# Patient Record
Sex: FEMALE | Race: WHITE | NOT HISPANIC OR LATINO | Employment: FULL TIME | URBAN - METROPOLITAN AREA
[De-identification: names, ages, dates, MRNs, and addresses within clinical notes are randomized per-mention and may not be internally consistent; named-entity substitution may affect disease eponyms.]

---

## 2023-01-02 ENCOUNTER — TRANSFERRED RECORDS (OUTPATIENT)
Dept: HEALTH INFORMATION MANAGEMENT | Facility: CLINIC | Age: 28
End: 2023-01-02

## 2024-05-03 ENCOUNTER — LAB REQUISITION (OUTPATIENT)
Dept: LAB | Facility: CLINIC | Age: 29
End: 2024-05-03

## 2024-05-03 DIAGNOSIS — Z01.419 ENCOUNTER FOR GYNECOLOGICAL EXAMINATION (GENERAL) (ROUTINE) WITHOUT ABNORMAL FINDINGS: ICD-10-CM

## 2024-05-03 PROCEDURE — G0145 SCR C/V CYTO,THINLAYER,RESCR: HCPCS | Performed by: PHYSICIAN ASSISTANT

## 2024-05-03 PROCEDURE — 87624 HPV HI-RISK TYP POOLED RSLT: CPT | Performed by: PHYSICIAN ASSISTANT

## 2024-05-08 LAB
BKR LAB AP GYN ADEQUACY: NORMAL
BKR LAB AP GYN INTERPRETATION: NORMAL
BKR LAB AP HPV REFLEX: NORMAL
BKR LAB AP LMP: NORMAL
BKR LAB AP PREVIOUS ABNL DX: NORMAL
BKR LAB AP PREVIOUS ABNORMAL: NORMAL
PATH REPORT.COMMENTS IMP SPEC: NORMAL
PATH REPORT.COMMENTS IMP SPEC: NORMAL
PATH REPORT.RELEVANT HX SPEC: NORMAL

## 2024-05-09 ENCOUNTER — MEDICAL CORRESPONDENCE (OUTPATIENT)
Dept: HEALTH INFORMATION MANAGEMENT | Facility: CLINIC | Age: 29
End: 2024-05-09

## 2024-05-09 ENCOUNTER — TRANSFERRED RECORDS (OUTPATIENT)
Dept: HEALTH INFORMATION MANAGEMENT | Facility: CLINIC | Age: 29
End: 2024-05-09

## 2024-05-09 DIAGNOSIS — R42 DIZZINESS AND GIDDINESS: ICD-10-CM

## 2024-05-09 DIAGNOSIS — G90.A POTS (POSTURAL ORTHOSTATIC TACHYCARDIA SYNDROME): Primary | ICD-10-CM

## 2024-05-13 LAB
HUMAN PAPILLOMA VIRUS 16 DNA: NEGATIVE
HUMAN PAPILLOMA VIRUS 18 DNA: NEGATIVE
HUMAN PAPILLOMA VIRUS FINAL DIAGNOSIS: ABNORMAL
HUMAN PAPILLOMA VIRUS OTHER HR: POSITIVE

## 2024-08-06 NOTE — TELEPHONE ENCOUNTER
Action 8/6/24 Pt call   Action Taken Spoke to pt via phone regarding records. She stated she doesn't have any formal records regarding POTS. Last EKG was done in early 2022. Said she went to urgent care in Jan 2023 at [facility] and provider mentioned she has some symptoms consistent with POTS. Did an informal checking of vitals sitting vs. standing. She has note she will email to me or bring a hard  copy to appt visit. Otherwise no other cardiac  or POTS records.    Received records from pt via email. She realized she doesn't have full notes, just AVS. Sent to scanning. 8/8

## 2024-08-13 ENCOUNTER — OFFICE VISIT (OUTPATIENT)
Dept: CARDIOLOGY | Facility: CLINIC | Age: 29
End: 2024-08-13
Attending: INTERNAL MEDICINE
Payer: COMMERCIAL

## 2024-08-13 ENCOUNTER — PRE VISIT (OUTPATIENT)
Dept: CARDIOLOGY | Facility: CLINIC | Age: 29
End: 2024-08-13

## 2024-08-13 VITALS
OXYGEN SATURATION: 98 % | WEIGHT: 144 LBS | DIASTOLIC BLOOD PRESSURE: 80 MMHG | SYSTOLIC BLOOD PRESSURE: 116 MMHG | HEART RATE: 88 BPM

## 2024-08-13 DIAGNOSIS — G90.A POTS (POSTURAL ORTHOSTATIC TACHYCARDIA SYNDROME): Primary | ICD-10-CM

## 2024-08-13 DIAGNOSIS — D68.59 PRIMARY HYPERCOAGULABLE STATE (H): ICD-10-CM

## 2024-08-13 DIAGNOSIS — R55 VASOVAGAL SYNCOPE: ICD-10-CM

## 2024-08-13 DIAGNOSIS — R55 SYNCOPE, UNSPECIFIED SYNCOPE TYPE: ICD-10-CM

## 2024-08-13 PROCEDURE — 93010 ELECTROCARDIOGRAM REPORT: CPT | Mod: XE | Performed by: INTERNAL MEDICINE

## 2024-08-13 PROCEDURE — 99204 OFFICE O/P NEW MOD 45 MIN: CPT | Performed by: INTERNAL MEDICINE

## 2024-08-13 PROCEDURE — 93005 ELECTROCARDIOGRAM TRACING: CPT

## 2024-08-13 PROCEDURE — 93242 EXT ECG>48HR<7D RECORDING: CPT | Performed by: INTERNAL MEDICINE

## 2024-08-13 PROCEDURE — 99211 OFF/OP EST MAY X REQ PHY/QHP: CPT | Performed by: INTERNAL MEDICINE

## 2024-08-13 RX ORDER — SPIRONOLACTONE 100 MG/1
TABLET, FILM COATED ORAL
COMMUNITY

## 2024-08-13 RX ORDER — DEXTROAMPHETAMINE SACCHARATE, AMPHETAMINE ASPARTATE MONOHYDRATE, DEXTROAMPHETAMINE SULFATE AND AMPHETAMINE SULFATE 7.5; 7.5; 7.5; 7.5 MG/1; MG/1; MG/1; MG/1
1 CAPSULE, EXTENDED RELEASE ORAL EVERY MORNING
COMMUNITY

## 2024-08-13 RX ORDER — TRAZODONE HYDROCHLORIDE 50 MG/1
50 TABLET, FILM COATED ORAL
COMMUNITY
Start: 2023-12-13

## 2024-08-13 RX ORDER — LEVONORGESTREL 52 MG/1
INTRAUTERINE DEVICE INTRAUTERINE
COMMUNITY
Start: 2023-12-13

## 2024-08-13 ASSESSMENT — PAIN SCALES - GENERAL: PAINLEVEL: NO PAIN (0)

## 2024-08-13 NOTE — PATIENT INSTRUCTIONS
Ziopatch placed today, to be worn for 3 days.  After monitor is off, complete an echocardiogram (ultrasound of heart).    Follow up based on results.

## 2024-08-13 NOTE — NURSING NOTE
Ann Bynum arrived here on 8/13/2024 9:52 AM for 3-7 Days  Zio monitor placement per ordering provider Darryl Arnold MD  for the diagnosis Syncope, unspecified syncope type [R55] .  Dr. Hilliard is the supervising MD. Patient s skin was prepped per protocol.  Zio monitor was placed.  Instructions were reviewed with and given to the patient.  Patient verbalized understanding of wear, troubleshooting and monitor return instructions.

## 2024-08-13 NOTE — LETTER
8/13/2024      RE: Ann Bynum  [address]  [address]  [facility] [address]       Dear Colleague,    Thank you for the opportunity to participate in the care of your patient, Ann Bynum, at the [facility] [place] at [facility]. Please see a copy of my visit note below.    I am delighted to see Ann Bynum in consultation.The primary encounter diagnosis was POTS (postural orthostatic tachycardia syndrome). A diagnosis of Syncope, unspecified syncope type was also pertinent to this visit.   As you know, the patient is a 28 year old  female. She   has a past medical history of Palpitations and Syncope..    On this visit, the patient states that she has syncopal and near syncopal episodes when going from sitting to standing .  The patient denies chest pressure/discomfort, dyspnea, orthopnea, paroxysmal nocturnal dyspnea, and lower extermity edema.    The patient's cardiovascular risk factors include none.    The following portions of the patient's history were reviewed and updated as appropriate: allergies, current medications, past family history, past medical history, past social history, past surgical history, and the problem list.    PMH: The patient's past medical history includes:    Past Medical History:   Diagnosis Date     Palpitations      Syncope       History reviewed. No pertinent surgical history.    The patient's medications as of the current encounter are:     Current Outpatient Medications   Medication Sig Dispense Refill     amphetamine-dextroamphetamine (ADDERALL XR) 30 MG 24 hr capsule Take 1 capsule by mouth every morning       FLUoxetine (PROZAC) 20 MG capsule Take 20 mg by mouth daily       levonorgestrel (MIRENA, 52 MG,) 52 MG (20 mcg/day) IUD Take by intrauterine route.       Melatonin 2.5 MG CHEW Take by mouth as needed       spironolactone (ALDACTONE) 100 MG tablet        traZODone (DESYREL) 50 MG  tablet Take 50 mg by mouth         Labs:     Lab Requisition on 05/03/2024   Component Date Value Ref Range Status     Interpretation 05/03/2024 Negative for Intraepithelial Lesion or Malignancy (NILM)    Final     Comment 05/03/2024    Final                    Value:This result contains rich text formatting which cannot be displayed here.     Specimen Adequacy 05/03/2024 Satisfactory for evaluation, endocervical/transformation zone component present   Final     Clinical Information 05/03/2024    Final                    Value:This result contains rich text formatting which cannot be displayed here.     LMP/Menopause Date 05/03/2024    Final                    Value:This result contains rich text formatting which cannot be displayed here.     Reflex Testing 05/03/2024 Yes regardless of result   Final     Previous Abnormal? 05/03/2024    Final                    Value:This result contains rich text formatting which cannot be displayed here.     Previous Abnormal Diagnosis 05/03/2024    Final                    Value:This result contains rich text formatting which cannot be displayed here.     Performing Labs 05/03/2024    Final                    Value:This result contains rich text formatting which cannot be displayed here.     Other HR HPV 05/03/2024 Positive (A)  Negative Final     HPV16 DNA 05/03/2024 Negative  Negative Final     HPV18 DNA 05/03/2024 Negative  Negative Final     FINAL DIAGNOSIS 05/03/2024    Final                    Value:This result contains rich text formatting which cannot be displayed here.       Allergies:  No Known Allergies    Family History:   Family History   Problem Relation Age of Onset     Hyperlipidemia Mother      Hypertension Mother      Coronary Artery Disease Father      Hyperlipidemia Father      Hypertension Father      No Known Problems Brother      No Known Problems Sister        Psychosocial history:  reports that she has never smoked. She has never been exposed to tobacco  smoke. She has never used smokeless tobacco. She reports that she does not use drugs.    Review of systems: negative for, exertional chest pain or pressure, paroxysmal nocturnal dyspnea, dyspnea on exertion, orthopnea, lower extremity edema, claudication, and exercise intolerance    In addition,   General: No change in weight, sleep or appetite.  Normal energy.  No fever or chills  Eyes: Negative for vision changes or eye problems  ENT: No problems with ears, nose or throat.  No difficulty swallowing.  Resp: No coughing, wheezing or shortness of breath; h/o provoked PE and hypercoag state  GI: No nausea, vomiting,  heartburn, abdominal pain, diarrhea, constipation or change in bowel habits  : No urinary frequency or dysuria, bladder or kidney problems  Musculoskeletal: No significant muscle or joint pains  Neurologic: No headaches, numbness, tingling, weakness, problems with balance or coordination  Psychiatric: stable anxiety  Heme/immune/allergy: No history of bleeding or clotting problems or anemia.    Endocrine: No history of thyroid disease, diabetes or other endocrine disorders  Skin: acne  Vascular:  No claudication, lifestyle limiting or otherwise; no ischemic rest pain; no non-healing ulcers. No weakness, No loss of sensation        Physical examination  Vitals: /80 (BP Location: Right arm, Patient Position: Standing, Cuff Size: Adult Regular)   Pulse 88   Wt 65.3 kg (144 lb)   SpO2 98%   BMI= There is no height or weight on file to calculate BMI.    Orthostatic unremarkable    In general, the patient is a pleasant female in no apparent distress.    HEENT: Normiocephalic and atraumatic.  PERRLA.  EOMI.  Sclerae white, not injected.    Neck: No adenopathy.  No thyromegaly. Carotids +2/2 bilaterally without bruits.  No jugular venous distension.   Heart:  The PMI is in the 5th ICS in the midclavicular line. There is no heave. Regular rate and rhythm. Normal S1, S2 splits physiologically. No murmur,  rub, click, or gallop.    Lungs: Clear to asculation.  No ronchi, wheezes, rales.  No dullness to percussion.   Abdomen: Soft, nontender, nondistended. No organomegaly. No AAA.  No bruits.   Extremities: No clubbing, cyanosis, or edema. The pulses were intact bilaterally.   Neurological: The neurological examination reveal a patient who was oriented to person, place, and time.  The remainder of the examination was nonfocal.    Cardiac tests include:    8/13/2024 - ECG - NSR, normal    Assessment and Plan    Neurocardiogenic syncope - will check echo, zio  - discussed hydration, compression stocking, salt intake  - discussed tilt table  - discussed exacerbation by Adderall and spironolactone  - discussed beat blockers, fludrocortisone, midodrine, SSRIs  - patient elected conservative management  - patient moving next week to [place] [id number]. H/o PE with hypercoag state    The patient is to return  prn. The patient understood the treatment plan as outlined above.  There were no barriers to learning.      Darryl Arnold MD     Please do not hesitate to contact me if you have any questions/concerns.     Sincerely,     Darryl Arnold MD

## 2024-08-13 NOTE — NURSING NOTE
Chief Complaint   Patient presents with    New Patient      8-13-24 Arnold       Vitals were taken, medications reconciled and EKG performed.    Jonny Durant, Visit Facilitator  8:44 AM

## 2024-08-13 NOTE — PROGRESS NOTES
I am delighted to see Ann Bynum in consultation.The primary encounter diagnosis was POTS (postural orthostatic tachycardia syndrome). A diagnosis of Syncope, unspecified syncope type was also pertinent to this visit.   As you know, the patient is a 28 year old  female. She   has a past medical history of Palpitations and Syncope..    On this visit, the patient states that she has syncopal and near syncopal episodes when going from sitting to standing .  The patient denies chest pressure/discomfort, dyspnea, orthopnea, paroxysmal nocturnal dyspnea, and lower extermity edema.    The patient's cardiovascular risk factors include none.    The following portions of the patient's history were reviewed and updated as appropriate: allergies, current medications, past family history, past medical history, past social history, past surgical history, and the problem list.    PMH: The patient's past medical history includes:    Past Medical History:   Diagnosis Date    Palpitations     Syncope       History reviewed. No pertinent surgical history.    The patient's medications as of the current encounter are:     Current Outpatient Medications   Medication Sig Dispense Refill    amphetamine-dextroamphetamine (ADDERALL XR) 30 MG 24 hr capsule Take 1 capsule by mouth every morning      FLUoxetine (PROZAC) 20 MG capsule Take 20 mg by mouth daily      levonorgestrel (MIRENA, 52 MG,) 52 MG (20 mcg/day) IUD Take by intrauterine route.      Melatonin 2.5 MG CHEW Take by mouth as needed      spironolactone (ALDACTONE) 100 MG tablet       traZODone (DESYREL) 50 MG tablet Take 50 mg by mouth         Labs:     Lab Requisition on 05/03/2024   Component Date Value Ref Range Status    Interpretation 05/03/2024 Negative for Intraepithelial Lesion or Malignancy (NILM)    Final    Comment 05/03/2024    Final                    Value:This result contains rich text formatting which cannot be displayed here.    Specimen Adequacy  05/03/2024 Satisfactory for evaluation, endocervical/transformation zone component present   Final    Clinical Information 05/03/2024    Final                    Value:This result contains rich text formatting which cannot be displayed here.    LMP/Menopause Date 05/03/2024    Final                    Value:This result contains rich text formatting which cannot be displayed here.    Reflex Testing 05/03/2024 Yes regardless of result   Final    Previous Abnormal? 05/03/2024    Final                    Value:This result contains rich text formatting which cannot be displayed here.    Previous Abnormal Diagnosis 05/03/2024    Final                    Value:This result contains rich text formatting which cannot be displayed here.    Performing Labs 05/03/2024    Final                    Value:This result contains rich text formatting which cannot be displayed here.    Other HR HPV 05/03/2024 Positive (A)  Negative Final    HPV16 DNA 05/03/2024 Negative  Negative Final    HPV18 DNA 05/03/2024 Negative  Negative Final    FINAL DIAGNOSIS 05/03/2024    Final                    Value:This result contains rich text formatting which cannot be displayed here.       Allergies:  No Known Allergies    Family History:   Family History   Problem Relation Age of Onset    Hyperlipidemia Mother     Hypertension Mother     Coronary Artery Disease Father     Hyperlipidemia Father     Hypertension Father     No Known Problems Brother     No Known Problems Sister        Psychosocial history:  reports that she has never smoked. She has never been exposed to tobacco smoke. She has never used smokeless tobacco. She reports that she does not use drugs.    Review of systems: negative for, exertional chest pain or pressure, paroxysmal nocturnal dyspnea, dyspnea on exertion, orthopnea, lower extremity edema, claudication, and exercise intolerance    In addition,   General: No change in weight, sleep or appetite.  Normal energy.  No fever or  chills  Eyes: Negative for vision changes or eye problems  ENT: No problems with ears, nose or throat.  No difficulty swallowing.  Resp: No coughing, wheezing or shortness of breath; h/o provoked PE and hypercoag state  GI: No nausea, vomiting,  heartburn, abdominal pain, diarrhea, constipation or change in bowel habits  : No urinary frequency or dysuria, bladder or kidney problems  Musculoskeletal: No significant muscle or joint pains  Neurologic: No headaches, numbness, tingling, weakness, problems with balance or coordination  Psychiatric: stable anxiety  Heme/immune/allergy: No history of bleeding or clotting problems or anemia.    Endocrine: No history of thyroid disease, diabetes or other endocrine disorders  Skin: acne  Vascular:  No claudication, lifestyle limiting or otherwise; no ischemic rest pain; no non-healing ulcers. No weakness, No loss of sensation        Physical examination  Vitals: /80 (BP Location: Right arm, Patient Position: Standing, Cuff Size: Adult Regular)   Pulse 88   Wt 65.3 kg (144 lb)   SpO2 98%   BMI= There is no height or weight on file to calculate BMI.    Orthostatic unremarkable    In general, the patient is a pleasant female in no apparent distress.    HEENT: Normiocephalic and atraumatic.  PERRLA.  EOMI.  Sclerae white, not injected.    Neck: No adenopathy.  No thyromegaly. Carotids +2/2 bilaterally without bruits.  No jugular venous distension.   Heart:  The PMI is in the 5th ICS in the midclavicular line. There is no heave. Regular rate and rhythm. Normal S1, S2 splits physiologically. No murmur, rub, click, or gallop.    Lungs: Clear to asculation.  No ronchi, wheezes, rales.  No dullness to percussion.   Abdomen: Soft, nontender, nondistended. No organomegaly. No AAA.  No bruits.   Extremities: No clubbing, cyanosis, or edema. The pulses were intact bilaterally.   Neurological: The neurological examination reveal a patient who was oriented to person, place, and  time.  The remainder of the examination was nonfocal.    Cardiac tests include:    8/13/2024 - ECG - NSR, normal    Assessment and Plan    Neurocardiogenic syncope - will check echo, zio  - discussed hydration, compression stocking, salt intake  - discussed tilt table  - discussed exacerbation by Adderall and spironolactone  - discussed beat blockers, fludrocortisone, midodrine, SSRIs  - patient elected conservative management  - patient moving next week to [place] [address]. H/o PE with hypercoag state    The patient is to return  prn. The patient understood the treatment plan as outlined above.  There were no barriers to learning.      Darryl Arnold MD

## 2024-08-14 LAB
ATRIAL RATE - MUSE: 75 BPM
DIASTOLIC BLOOD PRESSURE - MUSE: NORMAL MMHG
INTERPRETATION ECG - MUSE: NORMAL
P AXIS - MUSE: 35 DEGREES
PR INTERVAL - MUSE: 148 MS
QRS DURATION - MUSE: 88 MS
QT - MUSE: 376 MS
QTC - MUSE: 419 MS
R AXIS - MUSE: 79 DEGREES
SYSTOLIC BLOOD PRESSURE - MUSE: NORMAL MMHG
T AXIS - MUSE: 33 DEGREES
VENTRICULAR RATE- MUSE: 75 BPM

## 2025-06-08 ENCOUNTER — HEALTH MAINTENANCE LETTER (OUTPATIENT)
Age: 30
End: 2025-06-08